# Patient Record
Sex: FEMALE | Race: BLACK OR AFRICAN AMERICAN | Employment: FULL TIME | URBAN - METROPOLITAN AREA
[De-identification: names, ages, dates, MRNs, and addresses within clinical notes are randomized per-mention and may not be internally consistent; named-entity substitution may affect disease eponyms.]

---

## 2017-03-29 ENCOUNTER — OFFICE VISIT (OUTPATIENT)
Dept: SURGERY | Age: 52
End: 2017-03-29

## 2017-03-29 VITALS
DIASTOLIC BLOOD PRESSURE: 74 MMHG | SYSTOLIC BLOOD PRESSURE: 130 MMHG | RESPIRATION RATE: 20 BRPM | WEIGHT: 238.5 LBS | BODY MASS INDEX: 35.32 KG/M2 | TEMPERATURE: 98.2 F | HEIGHT: 69 IN | HEART RATE: 81 BPM | OXYGEN SATURATION: 98 %

## 2017-03-29 DIAGNOSIS — Z98.84 S/P LAPAROSCOPIC SLEEVE GASTRECTOMY: ICD-10-CM

## 2017-03-29 DIAGNOSIS — E66.01 MORBID OBESITY DUE TO EXCESS CALORIES (HCC): Primary | ICD-10-CM

## 2017-03-29 NOTE — PATIENT INSTRUCTIONS

## 2017-03-29 NOTE — PROGRESS NOTES
Pamela Hickey is a 45 y.o. female 9 months status post lap sleeve gastrectomy, down 71.5 pounds. Weight today is 238.5 pounds. Stated has been having family issues with son and grandson. Stated has had some bout of depression. Stated goal is to get to 200 to 210 pounds at one year anniversary of sleeve. Denies nausea, no vomiting, no heartburn/reflux. Denies dysphagia. No fever/no chills, no shortness of breath, no chest pain, and no abdominal pain. Tolerating all foods, getting 50-60 grams of protein daily. No snacking. Protein shake for breakfast. Drinking at least 60 ounces of water daily. Tolerating all vitamins and medications. Exercising has decrease, plans to start work with . Using pedometer also, getting 10,000 steps daily. Bowel movements once daily that are formed. HPI    Review of Systems   Constitutional: Negative for chills, fever and malaise/fatigue. Cardiovascular: Negative for chest pain, palpitations and leg swelling. Gastrointestinal: Negative for abdominal pain, blood in stool, constipation, diarrhea, heartburn, nausea and vomiting. Genitourinary: Positive for frequency. Negative for dysuria. Neurological: Negative for dizziness. Physical Exam   Constitutional: She is oriented to person, place, and time. She appears well-developed and well-nourished. No distress. Cardiovascular: Normal rate and normal heart sounds. Pulmonary/Chest: Effort normal and breath sounds normal. No respiratory distress. She has no wheezes. She has no rales. Abdominal: Soft. Bowel sounds are normal. She exhibits no distension. There is no tenderness. There is no rebound and no guarding. Lap sites healed. No hernia/masses palpated. Musculoskeletal: Normal range of motion. Neurological: She is alert and oriented to person, place, and time. Skin: Skin is warm. Psychiatric: She has a normal mood and affect.  Her behavior is normal.     Visit Vitals    /74 (BP 1 Location: Right arm, BP Patient Position: Sitting)    Pulse 81    Temp 98.2 °F (36.8 °C)    Resp 20    Ht 5' 9\" (1.753 m)    Wt 238 lb 8 oz (108.2 kg)    SpO2 98%    BMI 35.22 kg/m2       ASSESSMENT and PLAN  Morbid Obesity 9 months status post lap sleeve gastrectomy, down 71.5 pounds. Weight today is 238.5 pounds. Advised patient regard to diet that is high-protein, low-fat, low-sugar, limited carbohydrates. Strive for 60 grams of protein daily. If having a snack, foods that are protein or fiber rich. Still pay attention to behavioral factor and habits. No eating/drinking together, chew foods well, and portion control. Drink at least 40 ounces of non-carbonated, non-calorie beverages daily. Continue vitamin regiment daily. Exercise at least 3 days a week with cardiovascular and strength training. Will forego labs at this time. Recommend patient to attend support group. Patient to follow up in 3 months. Patient verbalized understanding and questions were answered to the best of my knowledge and ability. Exercise educational materials were provided. Advised to call office if any questions/concerns.

## 2017-03-29 NOTE — PROGRESS NOTES
1. Have you been to the ER, urgent care clinic since your last visit? Hospitalized since your last visit? No    2. Have you seen or consulted any other health care providers outside of the [address] [place] [address] since your last visit? Include any pap smears or colon screening.  No

## 2017-03-29 NOTE — MR AVS SNAPSHOT
Visit Information Date & Time Provider Department Dept. Phone Encounter #  
 3/29/2017  1:20 PM Zaire Beckman NP [phone] [id number] Follow-up Instructions Return in about 3 months (around 6/29/2017). Upcoming Health Maintenance Date Due Hepatitis C Screening 1965 FOOT EXAM Q1 1/17/1973 MICROALBUMIN Q1 1/17/1973 EYE EXAM RETINAL OR DILATED Q1 1/17/1973 Pneumococcal 19-64 Medium Risk (1 of 1 - PPSV23) 1/17/1982 DTaP/Tdap/Td series (1 - Tdap) 1/17/1984 PAP AKA CERVICAL CYTOLOGY 1/17/1984 FOBT Q 1 YEAR AGE 50-75 1/17/2013 INFLUENZA AGE 9 TO ADULT 8/1/2016 HEMOGLOBIN A1C Q6M 12/9/2016 BREAST CANCER SCRN MAMMOGRAM 3/16/2017 LIPID PANEL Q1 6/9/2017 Allergies as of 3/29/2017  Review Complete On: 3/29/2017 By: Zaire Beckman NP Severity Noted Reaction Type Reactions Aspirin  09/29/2015    Shortness of Breath Azmacort [Triamcinolone Acetonide]  09/29/2015    Hives Erythromycin  09/29/2015    Shortness of Breath \"difficulty breathing, spotty vison, , syncope Current Immunizations  Reviewed on 6/23/2016 No immunizations on file. Not reviewed this visit You Were Diagnosed With   
  
 Codes Comments Morbid obesity due to excess calories ([facility] [address]    -  Primary ICD-10-CM: E66.01 
ICD-9-CM: 278.01 S/P laparoscopic sleeve gastrectomy     ICD-10-CM: O27.17 ICD-9-CM: V45.86 BMI 35.0-35.9,adult     ICD-10-CM: V55.14 ICD-9-CM: V85.35 Vitals BP Pulse Temp Resp Height(growth percentile) Weight(growth percentile) 130/74 (BP 1 Location: Right arm, BP Patient Position: Sitting) 81 98.2 °F (36.8 °C) 20 5' 9\" (1.753 m) 238 lb 8 oz (108.2 kg) SpO2 BMI OB Status Smoking Status 98% 35.22 kg/m2 Hysterectomy Former Smoker Vitals History BMI and BSA Data Body Mass Index Body Surface Area  
 35.22 kg/m 2 2.3 m 2 Preferred Pharmacy Pharmacy Name Phone [id number] [address] [phone] Your Updated Medication List  
  
   
This list is accurate as of: 3/29/17  1:57 PM.  Always use your most recent med list.  
  
  
  
  
 ADVAIR DISKUS 100-50 mcg/dose diskus inhaler Generic drug:  fluticasone-salmeterol Take 1 Puff by inhalation every twelve (12) hours. CALCIUM CITRATE + D PO Take  by mouth.  
  
 carBAMazepine  mg SR tablet Commonly known as:  TEGretol XR Take 400 mg by mouth two (2) times a day. cyanocobalamin 500 mcg tablet Commonly known as:  B-12 DOTS Take 1 Tab by mouth daily. Indications: PREVENTION OF VITAMIN B12 DEFICIENCY  
  
 ergocalciferol 50,000 unit capsule Commonly known as:  ERGOCALCIFEROL Take 1 Cap by mouth every Monday, Wednesday, Friday. Indications: VITAMIN D DEFICIENCY (HIGH DOSE THERAPY) glucose blood VI test strips strip Commonly known as:  ONETOUCH ULTRA TEST Test blood glucose once daily Dx Code: E11.65 Lancets Misc Commonly known as: One Touch Luis Alberta Test blood glucose once daily Dx Code: E11.65  
  
 lithium carbonate 300 mg capsule  
take 1 capsule every morning and 2 capsule at bedtime MULTIVITAMIN WITH IRON PO Take  by mouth. omeprazole 40 mg capsule Commonly known as:  PRILOSEC Take 1 Cap by mouth daily. AFTER SURGERY  
  
 PROAIR HFA 90 mcg/actuation inhaler Generic drug:  albuterol Take 2 Puffs by inhalation every four (4) hours as needed. sertraline 50 mg tablet Commonly known as:  ZOLOFT  
  
 traZODone 50 mg tablet Commonly known as:  DESYREL  
take 1 tablet by mouth at bedtime if needed for sleep Follow-up Instructions Return in about 3 months (around 6/29/2017). Patient Instructions Learning About Physical Activity What is physical activity? Physical activity is any kind of activity that gets your body moving. The types of physical activity that can help you get fit and stay healthy include: · Aerobic or \"cardio\" activities that make your heart beat faster and make you breathe harder, such as brisk walking, riding a bike, or running. Aerobic activities strengthen your heart and lungs and build up your endurance. · Strength training activities that make your muscles work against, or \"resist,\" something, such as lifting weights or doing push-ups. These activities help tone and strengthen your muscles. · Stretches that allow you to move your joints and muscles through their full range of motion. Stretching helps you be more flexible and avoid injury. What are the benefits of physical activity? Being active is one of the best things you can do to get fit and stay healthy. It helps you to: · Feel stronger and have more energy to do all the things you like to do. · Focus better at school or work and perform better in sports. · Feel, think, and sleep better. · Reach and stay at a healthy weight. · Lose fat and build lean muscle. · Lower your risk for serious health problems. · Keep your bones, muscles, and joints strong. Being fit lets you do more physical activity. And it lets you work out harder without as much effort. How can you make physical activity part of your life? Get at least 30 minutes of exercise on most days of the week. Walking is a good choice. You also may want to do other activities, such as running, swimming, cycling, or playing tennis or team sports. Pick activities that you likeones that make your heart beat faster, your muscles stronger, and your muscles and joints more flexible. If you find more than one thing you like doing, do them all. You don't have to do the same thing every day. Get your heart pumping every day. Any activity that makes your heart beat faster and keeps it at that rate for a while counts. Here are some great ways to get your heart beating faster: · Go for a brisk walk, run, or bike ride. · Go for a hike or swim. · Go in-line skating. · Play a game of touch football, basketball, or soccer. · Ride a bike. · Play tennis or racquetball. · Climb stairs. Even some household chores can be aerobicjust do them at a faster pace. Vacuuming, raking or mowing the lawn, sweeping the garage, and washing and waxing the car all can help get your heart rate up. Strengthen your muscles during the week. You don't have to lift heavy weights or grow big, bulky muscles to get stronger. Doing a few simple activities that make your muscles work against, or \"resist,\" something can help you get stronger. For example, you can: · Do push-ups or sit-ups, which use your own body weight as resistance. · Lift weights or dumbbells or use stretch bands at home or in a gym or community center. Stretch your muscles often. Stretching will help you as you become more active. It can help you stay flexible, loosen tight muscles, and avoid injury. It can also help improve your balance and posture and can be a great way to relax. Be sure to stretch the muscles you'll be using when you work out. It's best to warm your muscles slightly before you stretch them. Walk or do some other light aerobic activity for a few minutes, and then start stretching. When you stretch your muscles: · Do it slowly. Stretching is not about going fast or making sudden movements. · Don't push or bounce during a stretch. · Hold each stretch for at least 15 to 30 seconds, if you can. You should feel a stretch in the muscle, but not pain. · Breathe out as you do the stretch. Then breathe in as you hold the stretch. Don't hold your breath. If you're worried about how more activity might affect your health, have a checkup before you start. Follow any special advice your doctor gives you for getting a smart start. Where can you learn more? Go to http://evonne.info/. Enter N572 in the search box to learn more about \"Learning About Physical Activity. \" Current as of: May 27, 2016 Content Version: 11.2 © 0926-9410 [employer], [employer]ar[employer]. Care instructions adapted under license by [employer] (which disclaims liability or warranty for this information). If you have questions about a medical condition or this instruction, always ask your healthcare professional. [address] any warranty or liability for your use of this information. Introducing \[facility]\"" & HEALTH SERVICES! Dear Carla: Thank you for requesting a FOUNDD account. Our records indicate that you already have an active FOUNDD account. You can access your account anytime at [email or web address] Did you know that you can access your hospital and ER discharge instructions at any time in FOUNDD? You can also review all of your test results from your hospital stay or ER visit. Additional Information If you have questions, please visit the Frequently Asked Questions section of the FOUNDD website at [email or web address] Remember, FOUNDD is NOT to be used for urgent needs. For medical emergencies, dial 911. Now available from your iPhone and Android! Please provide this summary of care documentation to your next provider. Your primary care clinician is listed as Grace Abdul. If you have any questions after today's visit, please call [phone].

## 2017-06-23 ENCOUNTER — OFFICE VISIT (OUTPATIENT)
Dept: SURGERY | Age: 52
End: 2017-06-23

## 2017-06-23 VITALS
RESPIRATION RATE: 20 BRPM | DIASTOLIC BLOOD PRESSURE: 80 MMHG | WEIGHT: 240.5 LBS | HEIGHT: 69 IN | BODY MASS INDEX: 35.62 KG/M2 | TEMPERATURE: 99.4 F | HEART RATE: 79 BPM | SYSTOLIC BLOOD PRESSURE: 130 MMHG

## 2017-06-23 DIAGNOSIS — D50.9 IRON DEFICIENCY ANEMIA, UNSPECIFIED IRON DEFICIENCY ANEMIA TYPE: ICD-10-CM

## 2017-06-23 DIAGNOSIS — E53.8 VITAMIN B12 DEFICIENCY: ICD-10-CM

## 2017-06-23 DIAGNOSIS — Z98.84 S/P LAPAROSCOPIC SLEEVE GASTRECTOMY: ICD-10-CM

## 2017-06-23 DIAGNOSIS — E66.01 MORBID OBESITY DUE TO EXCESS CALORIES (HCC): Primary | ICD-10-CM

## 2017-06-23 DIAGNOSIS — E55.9 VITAMIN D DEFICIENCY: ICD-10-CM

## 2017-06-23 NOTE — PROGRESS NOTES
1. Have you been to the ER, urgent care clinic since your last visit? Hospitalized since your last visit? No    2. Have you seen or consulted any other health care providers outside of the [address] [place] [address] since your last visit? Include any pap smears or colon screening.  No

## 2017-06-23 NOTE — MR AVS SNAPSHOT
Visit Information Date & Time Provider Department Dept. Phone Encounter #  
 6/23/2017  9:00 AM Bharathi Qureshi NP [facility] [phone] [id number] Follow-up Instructions Return in about 6 days (around 6/29/2017). Upcoming Health Maintenance Date Due Hepatitis C Screening 1965 FOOT EXAM Q1 1/17/1973 MICROALBUMIN Q1 1/17/1973 EYE EXAM RETINAL OR DILATED Q1 1/17/1973 Pneumococcal 19-64 Medium Risk (1 of 1 - PPSV23) 1/17/1982 DTaP/Tdap/Td series (1 - Tdap) 1/17/1984 PAP AKA CERVICAL CYTOLOGY 1/17/1984 FOBT Q 1 YEAR AGE 50-75 1/17/2013 HEMOGLOBIN A1C Q6M 12/9/2016 BREAST CANCER SCRN MAMMOGRAM 3/16/2017 LIPID PANEL Q1 6/9/2017 INFLUENZA AGE 9 TO ADULT 8/1/2017 Allergies as of 6/23/2017  Review Complete On: 6/23/2017 By: Bharathi Qureshi NP Severity Noted Reaction Type Reactions Aspirin  09/29/2015    Shortness of Breath Azmacort [Triamcinolone Acetonide]  09/29/2015    Hives Erythromycin  09/29/2015    Shortness of Breath \"difficulty breathing, spotty vison, , syncope Current Immunizations  Reviewed on 6/23/2016 No immunizations on file. Not reviewed this visit You Were Diagnosed With   
  
 Codes Comments Morbid obesity due to excess calories ([facility] [address]    -  Primary ICD-10-CM: E66.01 
ICD-9-CM: 278.01 S/P laparoscopic sleeve gastrectomy     ICD-10-CM: D94.24 ICD-9-CM: V45.86 Vitamin B12 deficiency     ICD-10-CM: E53.8 ICD-9-CM: 266.2 Iron deficiency anemia, unspecified iron deficiency anemia type     ICD-10-CM: D50.9 ICD-9-CM: 280.9 Vitamin D deficiency     ICD-10-CM: E55.9 ICD-9-CM: 268.9 BMI 35.0-35.9,adult     ICD-10-CM: P52.77 ICD-9-CM: V85.35 Vitals BP Pulse Temp Resp Height(growth percentile) Weight(growth percentile) 130/80 (BP 1 Location: Left arm, BP Patient Position: At rest) 79 99.4 °F (37.4 °C) 20 5' 9\" (1.753 m) 240 lb 8 oz (109.1 kg) BMI OB Status Smoking Status 35.52 kg/m2 Hysterectomy Former Smoker Vitals History BMI and BSA Data Body Mass Index Body Surface Area 35.52 kg/m 2 2.3 m 2 Preferred Pharmacy Pharmacy Name Phone [id number] [address] [phone] Your Updated Medication List  
  
   
This list is accurate as of: 6/23/17  9:54 AM.  Always use your most recent med list.  
  
  
  
  
 ADVAIR DISKUS 100-50 mcg/dose diskus inhaler Generic drug:  fluticasone-salmeterol Take 1 Puff by inhalation every twelve (12) hours. CALCIUM CITRATE + D PO Take  by mouth.  
  
 carBAMazepine  mg SR tablet Commonly known as:  TEGretol XR Take 400 mg by mouth two (2) times a day. cyanocobalamin 500 mcg tablet Commonly known as:  B-12 DOTS Take 1 Tab by mouth daily. Indications: PREVENTION OF VITAMIN B12 DEFICIENCY  
  
 ergocalciferol 50,000 unit capsule Commonly known as:  ERGOCALCIFEROL Take 1 Cap by mouth every Monday, Wednesday, Friday. Indications: VITAMIN D DEFICIENCY (HIGH DOSE THERAPY) glucose blood VI test strips strip Commonly known as:  ONETOUCH ULTRA TEST Test blood glucose once daily Dx Code: E11.65 Lancets Misc Commonly known as: One Touch Zollie Right Test blood glucose once daily Dx Code: E11.65  
  
 lithium carbonate 300 mg capsule  
take 1 capsule every morning and 2 capsule at bedtime MULTIVITAMIN WITH IRON PO Take  by mouth. omeprazole 40 mg capsule Commonly known as:  PRILOSEC Take 1 Cap by mouth daily. AFTER SURGERY  
  
 PROAIR HFA 90 mcg/actuation inhaler Generic drug:  albuterol Take 2 Puffs by inhalation every four (4) hours as needed. We Performed the Following CBC W/O DIFF [98249 CPT(R)] IRON PROFILE [id number] CPT(R)] METABOLIC PANEL, COMPREHENSIVE [89991 CPT(R)] VITAMIN B1, WHOLE BLOOD [id number] CPT(R)] VITAMIN B12 & FOLATE [38554 CPT(R)] VITAMIN D, 25 HYDROXY [id number] CPT(R)] Follow-up Instructions Return in about 6 days (around 6/29/2017). Patient Instructions Learning About Changing a Habit by Setting Goals How can you change a habit? If you've decided to change a habitwhether it's quitting smoking, lowering your blood pressure, becoming more active, or doing something else to improve your healthcongratulations! Making that decision is the first step toward making a change. What happens next? Have a reason. Set goals you can reach. Prepare for slip-ups. And get support. What's your reason? Your reason for wanting to change a habit is really important. Maybe you want to quit smoking so that you can avoid future health problems. Or maybe you want to eat a healthier diet so you can lose weight. If you have high blood pressure, your reason may be clear: to lower your blood pressure. Maybe you smoke and want to save money on cigarettes. You need to feel ready to make a change. If you don't feel ready now, that's okay. You can still be thinking and planning. When you truly want to make changes, you're ready for the next step. It's not easy to change habitsbut you can do it. Taking the time to really think about what will motivate or inspire you will help you reach your goals. How do you set goals? · Focus on small goals. This will help you reach larger goals over time. With smaller goals, you'll have success more often, which will help you stay with it. For example, your large goal may be to lose 50 pounds. Your small goal could be to lose 5. 
· Write down your goals. This will help you remember, and you'll have a clearer idea of what you want to achieve. Use a journal or notebook to record your goals. Hang up your plan where you will see it often as a reminder of what you're trying to do. · Make your goals specific. Specific goals help you measure your progress. For example, setting a goal to eat 5 helpings of fruits and vegetables 5 days a week is better than a general goal to \"eat more vegetables. \" 
· Focus on one goal at a time. By doing this, you're less likely to feel overwhelmed and then give up. · When you reach a goal, reward yourself. Celebrate your new behavior and success for several days, and then think about setting your next goal. 
How can you prepare for slip-ups? It's perfectly normal to try to change a habit, go along fine for a while, and then have a setback. Lots of people try and try again before they reach their goals. What are the things that might cause a setback for you? If you have tried to change a habit before, think about what helped you and what got in your way. By thinking about these barriers now, you can plan ahead for how to deal with them if they happen. There will be times when you slip up and don't make your goal for the week. When that happens, don't get mad at yourself. Learn from the experience. Ask yourself what got in the way of reaching your goal. Positive thinking goes a long way when you're making lifestyle changes. How can you get support? · Get a partner. It's motivating to know that someone is trying to make the same change that you're making, like being more active or changing your eating habits. You have someone who is counting on you to help him or her succeed. That person can also remind you how far you've come. · Get friends and family involved. They can exercise with you or encourage you by saying how they admire what you are doing. Family members can join you in your healthy eating efforts. Don't be afraid to tell family and friends that their encouragement makes a big difference to you. · Join a class or support group. People in these groups often have some of the same barriers you have. They can give you support when you don't feel like staying with your plan.  They can boost your morale when you need a Keri Acosta also find a number of online support groups. · Encourage yourself. When you feel like giving up, don't waste energy feeling bad about yourself. Remember your reason for wanting to change, think about the progress you've made, and give yourself a pep talk and a pat on the back. · Get professional help. A dietitian can help you make your diet healthier while still allowing you to eat foods that you enjoy. A  or physical therapist can help design an exercise program that is fun and easy to stay on. A counselor, a , or your doctor can help you overcome hurdles, reduce stress, or quit smoking. Where can you learn more? Go to http://blaze-sari.info/. Enter P967 in the search box to learn more about \"Learning About Changing a Habit by Setting Goals. \" Current as of: July 26, 2016 Content Version: 11.3 © 6484-5342 [employer]. Care instructions adapted under license by [employer] (which disclaims liability or warranty for this information). If you have questions about a medical condition or this instruction, always ask your healthcare professional. [place] [address] any warranty or liability for your use of this information. Introducing [facility] & HEALTH SERVICES! Dear Carla: Thank you for requesting a Contextbroker account. Our records indicate that you already have an active Contextbroker account. You can access your account anytime at [email or web address]. [email or web address] Did you know that you can access your hospital and ER discharge instructions at any time in Contextbroker? You can also review all of your test results from your hospital stay or ER visit. Additional Information If you have questions, please visit the Frequently Asked Questions section of the Contextbroker website at [email or web address]/. Remember, Contextbroker is NOT to be used for urgent needs. For medical emergencies, dial 911. Now available from your iPhone and Android! Please provide this summary of care documentation to your next provider. Your primary care clinician is listed as Jarvis Cortez. If you have any questions after today's visit, please call [phone].

## 2017-06-23 NOTE — PATIENT INSTRUCTIONS
Learning About Changing a Habit by Setting Goals  How can you change a habit? If you've decided to change a habit--whether it's quitting smoking, lowering your blood pressure, becoming more active, or doing something else to improve your health--congratulations! Making that decision is the first step toward making a change. What happens next? Have a reason. Set goals you can reach. Prepare for slip-ups. And get support. What's your reason? Your reason for wanting to change a habit is really important. Maybe you want to quit smoking so that you can avoid future health problems. Or maybe you want to eat a healthier diet so you can lose weight. If you have high blood pressure, your reason may be clear: to lower your blood pressure. Maybe you smoke and want to save money on cigarettes. You need to feel ready to make a change. If you don't feel ready now, that's okay. You can still be thinking and planning. When you truly want to make changes, you're ready for the next step. It's not easy to change habits--but you can do it. Taking the time to really think about what will motivate or inspire you will help you reach your goals. How do you set goals? · Focus on small goals. This will help you reach larger goals over time. With smaller goals, you'll have success more often, which will help you stay with it. For example, your large goal may be to lose 50 pounds. Your small goal could be to lose 5.  · Write down your goals. This will help you remember, and you'll have a clearer idea of what you want to achieve. Use a journal or notebook to record your goals. Hang up your plan where you will see it often as a reminder of what you're trying to do. · Make your goals specific. Specific goals help you measure your progress. For example, setting a goal to eat 5 helpings of fruits and vegetables 5 days a week is better than a general goal to \"eat more vegetables. \"  · Focus on one goal at a time.  By doing this, you're less likely to feel overwhelmed and then give up. · When you reach a goal, reward yourself. Celebrate your new behavior and success for several days, and then think about setting your next goal.  How can you prepare for slip-ups? It's perfectly normal to try to change a habit, go along fine for a while, and then have a setback. Lots of people try and try again before they reach their goals. What are the things that might cause a setback for you? If you have tried to change a habit before, think about what helped you and what got in your way. By thinking about these barriers now, you can plan ahead for how to deal with them if they happen. There will be times when you slip up and don't make your goal for the week. When that happens, don't get mad at yourself. Learn from the experience. Ask yourself what got in the way of reaching your goal. Positive thinking goes a long way when you're making lifestyle changes. How can you get support? · Get a partner. It's motivating to know that someone is trying to make the same change that you're making, like being more active or changing your eating habits. You have someone who is counting on you to help him or her succeed. That person can also remind you how far you've come. · Get friends and family involved. They can exercise with you or encourage you by saying how they admire what you are doing. Family members can join you in your healthy eating efforts. Don't be afraid to tell family and friends that their encouragement makes a big difference to you. · Join a class or support group. People in these groups often have some of the same barriers you have. They can give you support when you don't feel like staying with your plan. They can boost your morale when you need a lift. Jensen Beck also find a number of online support groups. · Encourage yourself. When you feel like giving up, don't waste energy feeling bad about yourself.  Remember your reason for wanting to change, think about the progress you've made, and give yourself a pep talk and a pat on the back. · Get professional help. A dietitian can help you make your diet healthier while still allowing you to eat foods that you enjoy. A  or physical therapist can help design an exercise program that is fun and easy to stay on. A counselor, a , or your doctor can help you overcome hurdles, reduce stress, or quit smoking. Where can you learn more? Go to http://blaze-sari.info/. Enter P005 in the search box to learn more about \"Learning About Changing a Habit by Setting Goals. \"  Current as of: July 26, 2016  Content Version: 11.3  © 5882-7365 [employer]. Care instructions adapted under license by [employer] (which disclaims liability or warranty for this information). If you have questions about a medical condition or this instruction, always ask your healthcare professional. [place] [address] any warranty or liability for your use of this information.

## 2017-06-23 NOTE — PROGRESS NOTES
Patsy Menezes is a 45 y.o. female 1 year s/p lap sleeve gastrectomy down 69.5 pounds. Weight today is 240.5 pounds. Patient is discourage about gaining 2 pounds since last visit. \" I have a goal weight of 180 pounds. \" Admits to not being active and snacking a little more. Denies nausea, no vomiting, no heartburn/reflux. Denies dysphagia. No fever/no chills, no shortness of breath, no chest pain, and no abdominal pain. Tolerating all foods. Adequate protein intake. Cut back on dairy and breads. Snacking pretzel. Drinking at least 40 ounces of water daily. Also drinking juice. Tolerating all vitamins and medications. Exercising has decrease. Stated has new job that has gym, so no excuse not to exercise. Bowel movements once every 1-2 days that are formed. HPI    Review of Systems   Constitutional: Negative for chills and fever. Respiratory: Negative for cough, sputum production and shortness of breath. Cardiovascular: Negative for chest pain, palpitations and leg swelling. Gastrointestinal: Negative for abdominal pain, blood in stool, constipation, diarrhea, heartburn, nausea and vomiting. Genitourinary: Negative for dysuria. Neurological: Negative for dizziness. Psychiatric/Behavioral: Positive for depression. The patient is nervous/anxious. Physical Exam   Constitutional: She is oriented to person, place, and time. She appears well-developed and well-nourished. No distress. Cardiovascular: Normal rate, regular rhythm and normal heart sounds. Pulmonary/Chest: Effort normal and breath sounds normal. No respiratory distress. She has no wheezes. She has no rales. Abdominal: Soft. Bowel sounds are normal. She exhibits no distension. There is no tenderness. There is no rebound and no guarding. Lap sites healed. No hernia/masses palpated   Musculoskeletal: Normal range of motion. She exhibits no edema. Neurological: She is alert and oriented to person, place, and time.    Skin: Skin is warm and dry. Psychiatric: Judgment normal. Her mood appears anxious. Cognition and memory are normal. She exhibits a depressed mood. Blood pressure 130/80, pulse 79, temperature 99.4 °F (37.4 °C), resp. rate 20, height 5' 9\" (1.753 m), weight 240 lb 8 oz (109.1 kg). ASSESSMENT and PLAN  Morbid Obesity  1 year s/p lap sleeve gastrectomy down 69.5 pounds. Weight today is 240.5 pounds. Recommended patient to have nutrition consultation and attend support to help with motivating patient as well. Advised patient regard to diet that is high-protein, low-fat, low-sugar, limited carbohydrates. Strive for 60 grams of protein daily. Discussed snacking choices. Need to incorporate more texture coarse food with vegetables and fruits. If having a snack, foods that are protein or fiber rich. Still pay attention to behavioral factor and habits. No eating/drinking together, chew foods well, and portion control. Drink at least 40 ounces of non-carbonated, non-calorie beverages daily. Continue vitamin regiment daily. Exercise at least 3 days a week with cardiovascular and strength training. Discussed different ways to exercise at home as well as use of gym. Patient to use pedometer. Provided patient with routine lab work slip. Advised anything concerning with labs, will contact patient prior to next visit. Patient to follow up in 6 months. Patient verbalized understanding and questions were answered to the best of my knowledge and ability. Goal setting educational materials were provided. Advised to call office if any questions/concerns.     42 minutes was spent with patient, 50% of time spent counseling

## 2017-06-27 LAB
25(OH)D3+25(OH)D2 SERPL-MCNC: 31.1 NG/ML (ref 30–100)
ALBUMIN SERPL-MCNC: 4.3 G/DL (ref 3.5–5.5)
ALBUMIN/GLOB SERPL: 1.6 {RATIO} (ref 1.2–2.2)
ALP SERPL-CCNC: 98 IU/L (ref 39–117)
ALT SERPL-CCNC: 13 IU/L (ref 0–32)
AST SERPL-CCNC: 18 IU/L (ref 0–40)
BILIRUB SERPL-MCNC: 0.5 MG/DL (ref 0–1.2)
BUN SERPL-MCNC: 13 MG/DL (ref 6–24)
BUN/CREAT SERPL: 16 (ref 9–23)
CALCIUM SERPL-MCNC: 9.7 MG/DL (ref 8.7–10.2)
CHLORIDE SERPL-SCNC: 102 MMOL/L (ref 96–106)
CO2 SERPL-SCNC: 26 MMOL/L (ref 18–29)
CREAT SERPL-MCNC: 0.81 MG/DL (ref 0.57–1)
ERYTHROCYTE [DISTWIDTH] IN BLOOD BY AUTOMATED COUNT: 15.6 % (ref 12.3–15.4)
FOLATE SERPL-MCNC: 20 NG/ML
GLOBULIN SER CALC-MCNC: 2.7 G/DL (ref 1.5–4.5)
GLUCOSE SERPL-MCNC: 87 MG/DL (ref 65–99)
HCT VFR BLD AUTO: 40.2 % (ref 34–46.6)
HGB BLD-MCNC: 12.9 G/DL (ref 11.1–15.9)
IRON SATN MFR SERPL: 30 % (ref 15–55)
IRON SERPL-MCNC: 95 UG/DL (ref 27–159)
MCH RBC QN AUTO: 24.1 PG (ref 26.6–33)
MCHC RBC AUTO-ENTMCNC: 32.1 G/DL (ref 31.5–35.7)
MCV RBC AUTO: 75 FL (ref 79–97)
PLATELET # BLD AUTO: 182 X10E3/UL (ref 150–379)
POTASSIUM SERPL-SCNC: 4.3 MMOL/L (ref 3.5–5.2)
PROT SERPL-MCNC: 7 G/DL (ref 6–8.5)
RBC # BLD AUTO: 5.36 X10E6/UL (ref 3.77–5.28)
SODIUM SERPL-SCNC: 144 MMOL/L (ref 134–144)
TIBC SERPL-MCNC: 322 UG/DL (ref 250–450)
UIBC SERPL-MCNC: 227 UG/DL (ref 131–425)
VIT B1 BLD-SCNC: 107.9 NMOL/L (ref 66.5–200)
VIT B12 SERPL-MCNC: 943 PG/ML (ref 211–946)
WBC # BLD AUTO: 6.5 X10E3/UL (ref 3.4–10.8)

## 2017-11-08 ENCOUNTER — APPOINTMENT (OUTPATIENT)
Dept: GENERAL RADIOLOGY | Age: 52
End: 2017-11-08
Attending: EMERGENCY MEDICINE
Payer: COMMERCIAL

## 2017-11-08 ENCOUNTER — HOSPITAL ENCOUNTER (EMERGENCY)
Age: 52
Discharge: HOME | End: 2017-11-08
Attending: EMERGENCY MEDICINE
Payer: COMMERCIAL

## 2017-11-08 VITALS
SYSTOLIC BLOOD PRESSURE: 151 MMHG | RESPIRATION RATE: 17 BRPM | TEMPERATURE: 98.3 F | DIASTOLIC BLOOD PRESSURE: 77 MMHG | OXYGEN SATURATION: 100 % | HEIGHT: 69 IN | HEART RATE: 60 BPM

## 2017-11-08 DIAGNOSIS — R07.9 ACUTE CHEST PAIN: Primary | ICD-10-CM

## 2017-11-08 LAB
ALBUMIN SERPL-MCNC: 3.6 G/DL (ref 3.5–5)
ALBUMIN/GLOB SERPL: 0.9 {RATIO} (ref 1.1–2.2)
ALP SERPL-CCNC: 86 U/L (ref 45–117)
ALT SERPL-CCNC: 31 U/L (ref 12–78)
ANION GAP SERPL CALC-SCNC: 10 MMOL/L (ref 5–15)
AST SERPL-CCNC: 21 U/L (ref 15–37)
ATRIAL RATE: 66 BPM
BASOPHILS # BLD: 0 K/UL (ref 0–0.1)
BASOPHILS NFR BLD: 0 % (ref 0–1)
BILIRUB SERPL-MCNC: 0.5 MG/DL (ref 0.2–1)
BUN SERPL-MCNC: 12 MG/DL (ref 6–20)
BUN/CREAT SERPL: 14 (ref 12–20)
CALCIUM SERPL-MCNC: 8.7 MG/DL (ref 8.5–10.1)
CALCULATED P AXIS, ECG09: 62 DEGREES
CALCULATED R AXIS, ECG10: 51 DEGREES
CALCULATED T AXIS, ECG11: 20 DEGREES
CHLORIDE SERPL-SCNC: 108 MMOL/L (ref 97–108)
CO2 SERPL-SCNC: 25 MMOL/L (ref 21–32)
CREAT SERPL-MCNC: 0.84 MG/DL (ref 0.55–1.02)
D DIMER PPP FEU-MCNC: 0.29 MG/L FEU (ref 0–0.65)
DIAGNOSIS, 93000: NORMAL
DIFFERENTIAL METHOD BLD: ABNORMAL
EOSINOPHIL # BLD: 0.2 K/UL (ref 0–0.4)
EOSINOPHIL NFR BLD: 3 % (ref 0–7)
ERYTHROCYTE [DISTWIDTH] IN BLOOD BY AUTOMATED COUNT: 14.8 % (ref 11.5–14.5)
GLOBULIN SER CALC-MCNC: 3.8 G/DL (ref 2–4)
GLUCOSE SERPL-MCNC: 91 MG/DL (ref 65–100)
HCT VFR BLD AUTO: 38.8 % (ref 35–47)
HGB BLD-MCNC: 12.1 G/DL (ref 11.5–16)
LIPASE SERPL-CCNC: 85 U/L (ref 73–393)
LYMPHOCYTES # BLD: 2.3 K/UL (ref 0.8–3.5)
LYMPHOCYTES NFR BLD: 31 % (ref 12–49)
MCH RBC QN AUTO: 23.9 PG (ref 26–34)
MCHC RBC AUTO-ENTMCNC: 31.2 G/DL (ref 30–36.5)
MCV RBC AUTO: 76.7 FL (ref 80–99)
MONOCYTES # BLD: 0.5 K/UL (ref 0–1)
MONOCYTES NFR BLD: 7 % (ref 5–13)
NEUTS SEG # BLD: 4.3 K/UL (ref 1.8–8)
NEUTS SEG NFR BLD: 59 % (ref 32–75)
P-R INTERVAL, ECG05: 144 MS
PLATELET # BLD AUTO: 167 K/UL (ref 150–400)
POTASSIUM SERPL-SCNC: 3.9 MMOL/L (ref 3.5–5.1)
PROT SERPL-MCNC: 7.4 G/DL (ref 6.4–8.2)
Q-T INTERVAL, ECG07: 396 MS
QRS DURATION, ECG06: 72 MS
QTC CALCULATION (BEZET), ECG08: 415 MS
RBC # BLD AUTO: 5.06 M/UL (ref 3.8–5.2)
RBC MORPH BLD: ABNORMAL
SODIUM SERPL-SCNC: 143 MMOL/L (ref 136–145)
TROPONIN I SERPL-MCNC: <0.04 NG/ML
TROPONIN I SERPL-MCNC: <0.04 NG/ML
VENTRICULAR RATE, ECG03: 66 BPM
WBC # BLD AUTO: 7.3 K/UL (ref 3.6–11)

## 2017-11-08 PROCEDURE — 80053 COMPREHEN METABOLIC PANEL: CPT | Performed by: EMERGENCY MEDICINE

## 2017-11-08 PROCEDURE — 85025 COMPLETE CBC W/AUTO DIFF WBC: CPT | Performed by: EMERGENCY MEDICINE

## 2017-11-08 PROCEDURE — 36415 COLL VENOUS BLD VENIPUNCTURE: CPT | Performed by: EMERGENCY MEDICINE

## 2017-11-08 PROCEDURE — 71020 XR CHEST PA LAT: CPT

## 2017-11-08 PROCEDURE — 93005 ELECTROCARDIOGRAM TRACING: CPT

## 2017-11-08 PROCEDURE — 74011000250 HC RX REV CODE- 250: Performed by: EMERGENCY MEDICINE

## 2017-11-08 PROCEDURE — 99285 EMERGENCY DEPT VISIT HI MDM: CPT

## 2017-11-08 PROCEDURE — 74011250637 HC RX REV CODE- 250/637: Performed by: EMERGENCY MEDICINE

## 2017-11-08 PROCEDURE — 83690 ASSAY OF LIPASE: CPT | Performed by: EMERGENCY MEDICINE

## 2017-11-08 PROCEDURE — 85379 FIBRIN DEGRADATION QUANT: CPT | Performed by: EMERGENCY MEDICINE

## 2017-11-08 PROCEDURE — 84484 ASSAY OF TROPONIN QUANT: CPT | Performed by: EMERGENCY MEDICINE

## 2017-11-08 RX ORDER — HYDROXYZINE 25 MG/1
TABLET, FILM COATED ORAL
COMMUNITY

## 2017-11-08 RX ORDER — LITHIUM CARBONATE 300 MG/1
300 CAPSULE ORAL
COMMUNITY

## 2017-11-08 RX ORDER — ACETAMINOPHEN 325 MG/1
650 TABLET ORAL
Status: COMPLETED | OUTPATIENT
Start: 2017-11-08 | End: 2017-11-08

## 2017-11-08 RX ORDER — FAMOTIDINE 20 MG/1
20 TABLET, FILM COATED ORAL 2 TIMES DAILY
Qty: 20 TAB | Refills: 0 | Status: SHIPPED | OUTPATIENT
Start: 2017-11-08

## 2017-11-08 RX ORDER — IBUPROFEN 600 MG/1
600 TABLET ORAL
Qty: 30 TAB | Refills: 0 | Status: SHIPPED | OUTPATIENT
Start: 2017-11-08 | End: 2017-11-11

## 2017-11-08 RX ORDER — LITHIUM CARBONATE 300 MG/1
600 CAPSULE ORAL
COMMUNITY

## 2017-11-08 RX ADMIN — LIDOCAINE HYDROCHLORIDE 40 ML: 20 SOLUTION ORAL; TOPICAL at 10:16

## 2017-11-08 RX ADMIN — ACETAMINOPHEN 650 MG: 325 TABLET ORAL at 11:27

## 2017-11-08 NOTE — ED TRIAGE NOTES
C/o intermittent mid sternal chest pain that radiates up into right side of neck then into left arm that started this morning. Hx of similar episodes in past but was never seen. Reports moving arms and neck worsens the pain, \"I can't carry my purse, it pulls on my arm and it makes the pain worse. \"

## 2017-11-08 NOTE — ED NOTES
MD reviewed discharge instructions and options with patient and patient verbalized understanding. RN reviewed discharge instructions using teachback method. Pt ambulated to exit without difficulty and in no signs of acute distress escorted by self, and she  will drive home. No complaints or needs expressed at this time. Patient was counseled on medications prescribed at discharge. VSS, verbalized relief from most intense pain. Patient to call Dr. Maritza Franks in the morning for appointment.

## 2017-11-08 NOTE — ED NOTES
Pt states she needs to call \"her lab\" because she has a certain arm they can get blood from? Requesting we wait to draw blood until she knows.

## 2017-11-08 NOTE — DISCHARGE INSTRUCTIONS
Chest Pain: Care Instructions  Your Care Instructions    There are many things that can cause chest pain. Some are not serious and will get better on their own in a few days. But some kinds of chest pain need more testing and treatment. Your doctor may have recommended a follow-up visit in the next 8 to 12 hours. If you are not getting better, you may need more tests or treatment. Even though your doctor has released you, you still need to watch for any problems. The doctor carefully checked you, but sometimes problems can develop later. If you have new symptoms or if your symptoms do not get better, get medical care right away. If you have worse or different chest pain or pressure that lasts more than 5 minutes or you passed out (lost consciousness), call 911 or seek other emergency help right away. A medical visit is only one step in your treatment. Even if you feel better, you still need to do what your doctor recommends, such as going to all suggested follow-up appointments and taking medicines exactly as directed. This will help you recover and help prevent future problems. How can you care for yourself at home? · Rest until you feel better. · Take your medicine exactly as prescribed. Call your doctor if you think you are having a problem with your medicine. · Do not drive after taking a prescription pain medicine. When should you call for help? Call 911 if:  ? · You passed out (lost consciousness). ? · You have severe difficulty breathing. ? · You have symptoms of a heart attack. These may include:  ¨ Chest pain or pressure, or a strange feeling in your chest.  ¨ Sweating. ¨ Shortness of breath. ¨ Nausea or vomiting. ¨ Pain, pressure, or a strange feeling in your back, neck, jaw, or upper belly or in one or both shoulders or arms. ¨ Lightheadedness or sudden weakness. ¨ A fast or irregular heartbeat.   After you call 911, the  may tell you to chew 1 adult-strength or 2 to 4 low-dose aspirin. Wait for an ambulance. Do not try to drive yourself. ?Call your doctor today if:  ? · You have any trouble breathing. ? · Your chest pain gets worse. ? · You are dizzy or lightheaded, or you feel like you may faint. ? · You are not getting better as expected. ? · You are having new or different chest pain. Where can you learn more? Go to http://blaze-sari.info/. Enter A120 in the search box to learn more about \"Chest Pain: Care Instructions. \"  Current as of: March 20, 2017  Content Version: 11.4  © 1766-1252 [employer]. Care instructions adapted under license by [employer] (which disclaims liability or warranty for this information). If you have questions about a medical condition or this instruction, always ask your healthcare professional. [address] any warranty or liability for your use of this information.

## 2017-11-08 NOTE — ED PROVIDER NOTES
HPI Comments: 45 y.o. female with past medical history significant for asthma, HTN, and COPD who presents from home via private vehicle with chief complaint of chest pain. Pt reports having constant mild midsternal chest pain since last night with episodes of intense pain starting 5.5 hours ago (0415) lasting 15-30 seconds radiating to her neck with accompanying nausea. Pt reports pain worsens when lifting items with her right arms. Pt reports last night she took a medicine she was given after gastric sleeve surgery for \"chest heaviness\" with temporary relief. Pt states that the episodes this morning worsened in pain, having \"about 10\" total episodes. Pt reports the last few episodes had pain radiating down her left arm, causing the arm to feel \"heavy and dead\". Pt states she had a stress test done before the gastric sleeve surgery in Dec. 2015. Pt denies any muscle spasms with pain. Pt denies fever or chills. There are no other acute medical concerns at this time. Social hx: Former smoker (Quit 2002), Former EtOH use (Quit 2002)  PCP: DAMON Zarate    Note written by Priyanka Muniz. Kendrick Christensen, as dictated by Gris Perez MD 9:42 AM      The history is provided by the patient. No  was used.         Past Medical History:   Diagnosis Date    Arthritis     Asthma     COPD (chronic obstructive pulmonary disease) ([address]     Diabetes ([address]     type 2    FH ischemic heart disease     dad passed away at 62 of MI    GERD (gastroesophageal reflux disease)     in past    Headache     migraines    Hypertension     Ill-defined condition 2003    respiratory failure with pneumonia    Morbid obesity ([address]     Morbid obesity ([address]     Psychiatric disorder     major depressive disorder       Past Surgical History:   Procedure Laterality Date    HX GYN  2005    uterine fibroids removed    HX LAP CHOLECYSTECTOMY  1987    HX OTHER SURGICAL  2001    partial hysterectomy    HX OTHER SURGICAL 6-23-16    Lap sleeve [facility] - [facility]- Dr. Darvin Gregory         Family History:   Problem Relation Age of Onset    Diabetes Mother     Hypertension Mother     Diabetes Father     Heart Disease Father     Coronary Artery Disease Father     Cancer Sister      breast    Diabetes Brother     Diabetes Brother     Kidney Disease Brother     Hypertension Maternal Grandmother     Cancer Paternal Uncle     Anesth Problems Neg Hx        Social History     Social History    Marital status:      Spouse name: N/A    Number of children: 2    Years of education: N/A     Occupational History    unemployed      Social History Main Topics    Smoking status: Former Smoker     Packs/day: 1.00     Years: 28.00     Quit date: 3/2/2002    Smokeless tobacco: Never Used    Alcohol use No      Comment: former-quit in 2002    Drug use: No    Sexual activity: No     Other Topics Concern    Not on file     Social History Narrative    In the home with 12year old grandson; has worked as a respiratory therapist in the past     Best friend to assist after surgery         ALLERGIES: Aspirin; Azmacort [triamcinolone acetonide]; and Erythromycin    Review of Systems   Constitutional: Negative for chills and fever. HENT: Negative for rhinorrhea and sore throat. Respiratory: Negative for cough and shortness of breath. Cardiovascular: Positive for chest pain. Gastrointestinal: Positive for nausea. Negative for abdominal pain, diarrhea and vomiting. Genitourinary: Negative for dysuria and urgency. Musculoskeletal: Negative for arthralgias and back pain. Skin: Negative for rash. Neurological: Negative for dizziness, weakness and light-headedness. All other systems reviewed and are negative.       Vitals:    11/08/17 0916 11/08/17 0930 11/08/17 0945   BP: 156/87 157/77 171/75   Pulse: 69 64 64   Resp: 18 23 22   Temp: 98.9 °F (37.2 °C)     SpO2: 99% 100% 100%   Height: 5' 9\" (1.753 m)              Physical Exam   Vital signs reviewed. Nursing notes reviewed. Const:  No acute distress, well developed, well nourished  Head:  Atraumatic, normocephalic  Eyes:  PERRL, conjunctiva normal, no scleral icterus  Neck:  Supple, trachea midline  Cardiovascular:  RRR, no murmurs, no gallops, no rubs (Mildly tender to anterior chest)  Resp:  No resp distress, no increased work of breathing, no wheezes, no rhonchi, no rales,  Abd:  Soft, non-tender, non-distended, no rebound, no guarding, no CVA tenderness  :  Deferred  MSK:  No pedal edema, normal ROM  Neuro:  Alert and oriented x3, no cranial nerve defect  Skin:  Warm, dry, intact  Psych: normal mood and affect, behavior is normal, judgement and thought content is normal    Note written by Lary Cruz, as dictated by Alvaro Caraballo MD 9:42 AM      MDM  Number of Diagnoses or Management Options  Acute chest pain:      Amount and/or Complexity of Data Reviewed  Clinical lab tests: ordered and reviewed  Tests in the radiology section of CPT®: reviewed and ordered  Review and summarize past medical records: yes    Patient Progress  Patient progress: stable    ED Course     Pt. Presents to the ER with CP. Her CP is atypical for ACS with negative cardiac enzymes. Negative d-dimer. NO pneumonia on xray. Possibly. Possibly GI. Pt. Says that she had some relief after GI cocktail. I will start her on pepcid. Pt. To f/u with her PCP or return to the ER with worsening sx.       Procedures

## 2018-04-25 ENCOUNTER — TELEPHONE (OUTPATIENT)
Dept: SURGERY | Age: 53
End: 2018-04-25

## 2019-04-24 ENCOUNTER — TELEPHONE (OUTPATIENT)
Dept: SURGERY | Age: 54
End: 2019-04-24

## 2020-04-24 ENCOUNTER — TELEPHONE (OUTPATIENT)
Dept: SURGERY | Age: 55
End: 2020-04-24

## 2023-05-19 RX ORDER — FLUTICASONE PROPIONATE AND SALMETEROL 100; 50 UG/1; UG/1
1 POWDER RESPIRATORY (INHALATION) EVERY 12 HOURS
COMMUNITY

## 2023-05-19 RX ORDER — OMEPRAZOLE 40 MG/1
40 CAPSULE, DELAYED RELEASE ORAL DAILY
COMMUNITY
Start: 2016-06-14

## 2023-05-19 RX ORDER — CARBAMAZEPINE 200 MG/1
400 TABLET, EXTENDED RELEASE ORAL 2 TIMES DAILY
COMMUNITY

## 2023-05-19 RX ORDER — ALBUTEROL SULFATE 90 UG/1
2 AEROSOL, METERED RESPIRATORY (INHALATION) EVERY 4 HOURS PRN
COMMUNITY

## 2023-05-19 RX ORDER — LITHIUM CARBONATE 300 MG/1
600 CAPSULE ORAL
COMMUNITY

## 2023-05-19 RX ORDER — HYDROXYZINE HYDROCHLORIDE 25 MG/1
TABLET, FILM COATED ORAL 3 TIMES DAILY PRN
COMMUNITY

## 2023-05-19 RX ORDER — FAMOTIDINE 20 MG/1
20 TABLET, FILM COATED ORAL 2 TIMES DAILY
COMMUNITY
Start: 2017-11-08

## 2025-09-05 ENCOUNTER — CLINICAL DOCUMENTATION (OUTPATIENT)
Age: 60
End: 2025-09-05